# Patient Record
Sex: FEMALE | Race: WHITE | NOT HISPANIC OR LATINO | Employment: OTHER | URBAN - METROPOLITAN AREA
[De-identification: names, ages, dates, MRNs, and addresses within clinical notes are randomized per-mention and may not be internally consistent; named-entity substitution may affect disease eponyms.]

---

## 2019-02-06 ENCOUNTER — APPOINTMENT (EMERGENCY)
Dept: RADIOLOGY | Facility: HOSPITAL | Age: 49
End: 2019-02-06
Payer: COMMERCIAL

## 2019-02-06 ENCOUNTER — APPOINTMENT (EMERGENCY)
Dept: ULTRASOUND IMAGING | Facility: HOSPITAL | Age: 49
End: 2019-02-06
Payer: COMMERCIAL

## 2019-02-06 ENCOUNTER — HOSPITAL ENCOUNTER (EMERGENCY)
Facility: HOSPITAL | Age: 49
Discharge: HOME | End: 2019-02-06
Attending: EMERGENCY MEDICINE | Admitting: EMERGENCY MEDICINE
Payer: COMMERCIAL

## 2019-02-06 VITALS
TEMPERATURE: 98.8 F | DIASTOLIC BLOOD PRESSURE: 64 MMHG | RESPIRATION RATE: 18 BRPM | OXYGEN SATURATION: 97 % | BODY MASS INDEX: 38.41 KG/M2 | WEIGHT: 225 LBS | SYSTOLIC BLOOD PRESSURE: 125 MMHG | HEIGHT: 64 IN | HEART RATE: 67 BPM

## 2019-02-06 DIAGNOSIS — M25.561 RIGHT KNEE PAIN: Primary | ICD-10-CM

## 2019-02-06 PROCEDURE — 93971 EXTREMITY STUDY: CPT

## 2019-02-06 PROCEDURE — 99284 EMERGENCY DEPT VISIT MOD MDM: CPT

## 2019-02-06 PROCEDURE — 73562 X-RAY EXAM OF KNEE 3: CPT

## 2019-02-06 RX ORDER — NAPROXEN 250 MG/1
500 TABLET ORAL ONCE
Status: COMPLETED | OUTPATIENT
Start: 2019-02-06 | End: 2019-02-06

## 2019-02-06 RX ORDER — NAPROXEN 500 MG/1
500 TABLET ORAL 2 TIMES DAILY WITH MEALS
Qty: 20 TABLET | Refills: 0 | Status: SHIPPED | OUTPATIENT
Start: 2019-02-06 | End: 2019-02-16

## 2019-02-06 RX ORDER — HYDROCODONE BITARTRATE AND ACETAMINOPHEN 5; 325 MG/1; MG/1
1 TABLET ORAL EVERY 6 HOURS PRN
Qty: 10 TABLET | Refills: 0 | Status: SHIPPED | OUTPATIENT
Start: 2019-02-06 | End: 2019-02-09

## 2019-02-06 RX ADMIN — NAPROXEN 500 MG: 250 TABLET ORAL at 14:39

## 2019-02-06 NOTE — DISCHARGE INSTRUCTIONS
Please return if you worsening other concerning symptoms otherwise please follow up with Orthopedics for re-evaluation as instructed as discussed    Knee Pain   WHAT YOU NEED TO KNOW:   Knee pain may start suddenly, or it may be a long-term problem  You may have pain on the side, front, or back of your knee  You may have knee stiffness and swelling  You may hear popping sounds or feel like your knee is giving way or locking up as you walk  You may feel pain when you sit, stand, walk, or climb up and down stairs  Knee pain can be caused by conditions such as obesity, inflammation, or strains or tears in ligaments or tendons  DISCHARGE INSTRUCTIONS:   Follow up with your healthcare provider within 24 hours or as directed: You may need follow-up treatments, such as steroid injections to decrease pain  Write down your questions so you remember to ask them during your visits  Self-care:   · Rest  your knee so it can heal  Limit activities that increase your pain  · Ice  can help reduce swelling  Wrap ice in a towel and put it on your knee for as long and as often as directed  · Compression  with a brace or bandage can help reduce swelling  Use a brace or bandage only as directed  · Elevation  helps decrease pain and swelling  Elevate your knee while you are sitting or lying down  Prop your leg on pillows to keep your knee above the level of your heart  Medicines:   · NSAIDs  help decrease swelling and pain or fever  This medicine is available with or without a doctor's order  NSAIDs can cause stomach bleeding or kidney problems in certain people  If you take blood thinner medicine, always ask your healthcare provider if NSAIDs are safe for you  Always read the medicine label and follow directions  · Acetaminophen  decreases pain and fever  It is available without a doctor's order  Ask how much to take and when to take it  Follow directions  Acetaminophen can cause liver damage if not taken correctly  · Take your medicine as directed  Contact your healthcare provider if you think your medicine is not helping or if you have side effects  Tell him or her if you are allergic to any medicine  Keep a list of the medicines, vitamins, and herbs you take  Include the amounts, and when and why you take them  Bring the list or the pill bottles to follow-up visits  Carry your medicine list with you in case of an emergency  Exercise as directed: You may need to see a physical therapist or do recommended exercises to improve movement and decrease your pain  You may be directed to walk, swim, or ride a bike  Follow your exercise plan exactly as directed to avoid further injury  Contact your healthcare provider if:   · You have questions or concerns about your condition or care  Return to the emergency department if:   · Your pain is worse, even after treatment  · You cannot bend or straighten your leg completely  · The swelling around your knee does not go down even with treatment  · Your knee is painful and hot to the touch  © 2017 [address] Information is for End User's use only and may not be sold, redistributed or otherwise used for commercial purposes  All illustrations and images included in CareNotes® are the copyrighted property of [employer]  or [employer]  The above information is an  only  It is not intended as medical advice for individual conditions or treatments  Talk to your doctor, nurse or pharmacist before following any medical regimen to see if it is safe and effective for you

## 2019-02-06 NOTE — ED PROVIDER NOTES
History  Chief Complaint   Patient presents with    Leg Pain     Pt reports right leg pain for the past week  Does not recall initial injury  12-year-old female denies significant past medical history here presenting for evaluation of right leg pain  Patient reports over last week or so she has had progressive pain localized to her right proximal lateral leg and right lateral joint line of her knee does radiate posteriorly she states that is gotten progressively worse she describes sharp and stabbing it is worse with ambulating she has not taken anything for this she does note she felt her legs give out several times secondary to the discomfort and she presents today concern for possible DVT as her daughter is a radiology technician  Patient's pain is localized primarily to her right lateral joint line of her right knee again sharp and stabbing worse with walking better with rest it does radiate posteriorly into the back of her proximal calf and knee better with rest she has no weakness paresthesias anesthesia she has no other signs symptoms risk factors for DVT or PE she has no unilateral leg swelling no overlying skin changes no traumatic mechanism no redness warmth or swelling she has no other joint involvement no other injuries complaints or concerns no hip or back pain no history of similar she otherwise denies a complete review systems as noted            None       History reviewed  No pertinent past medical history  History reviewed  No pertinent surgical history  History reviewed  No pertinent family history  I have reviewed and agree with the history as documented  Social History   Substance Use Topics    Smoking status: Never Smoker    Smokeless tobacco: Never Used    Alcohol use No        Review of Systems   Constitutional: Negative for activity change, appetite change, chills and fever  Respiratory: Negative for cough and shortness of breath      Cardiovascular: Negative for chest pain and palpitations  Gastrointestinal: Negative for nausea and vomiting  Genitourinary: Negative for dysuria, frequency and urgency  Musculoskeletal: Negative for arthralgias, back pain, gait problem, joint swelling and myalgias  Right knee pain   Skin: Negative for color change, rash and wound  Neurological: Negative for dizziness, weakness, light-headedness, numbness and headaches  Hematological: Negative for adenopathy  Does not bruise/bleed easily  Psychiatric/Behavioral: Negative for agitation and behavioral problems  All other systems reviewed and are negative  Physical Exam  Physical Exam   Constitutional: She is oriented to person, place, and time  She appears well-developed and well-nourished  No distress  Very well-appearing no acute distress   HENT:   Head: Normocephalic and atraumatic  Eyes: Pupils are equal, round, and reactive to light  EOM are normal    Neck: Normal range of motion  Neck supple  No tracheal deviation present  Cardiovascular: Normal rate, regular rhythm and normal heart sounds  Exam reveals no gallop and no friction rub  No murmur heard  Pulmonary/Chest: Effort normal and breath sounds normal  She has no wheezes  She has no rales     Musculoskeletal:   Patient has normal gait is moderately tender along the lateral joint line of her right knee in her right anterior lateral knee, there is no overlying skin changes no warmth joint effusion swelling erythema or other acute findings negative valgus and varus stress test negative anterior posterior drawer quadriceps is intact is no tenderness over the tibial plateau there is no unilateral leg swelling calf pain tenderness varicosities she has full range of motion of the hip without discomfort there is no tenderness over the thigh no tenderness of the ankle or foot she has 2+ symmetric pulses without other acute ischemic infectious inflammatory or traumatic findings   Neurological: She is alert and oriented to person, place, and time  No cranial nerve deficit  She exhibits normal muscle tone  Coordination normal    Skin: Skin is warm and dry  No rash noted  Psychiatric: She has a normal mood and affect  Her behavior is normal    Nursing note and vitals reviewed  Vital Signs  ED Triage Vitals [02/06/19 1321]   Temperature Pulse Respirations Blood Pressure SpO2   98 8 °F (37 1 °C) 96 17 (!) 190/106 99 %      Temp Source Heart Rate Source Patient Position - Orthostatic VS BP Location FiO2 (%)   Oral Monitor Sitting Left arm --      Pain Score       9           Vitals:    02/06/19 1321 02/06/19 1445   BP: (!) 190/106 125/64   Pulse: 96 67   Patient Position - Orthostatic VS: Sitting Lying       Visual Acuity      ED Medications  Medications   naproxen (NAPROSYN) tablet 500 mg (500 mg Oral Given 2/6/19 1439)       Diagnostic Studies  Results Reviewed     None                 XR knee 3 views Right non injury   ED Interpretation by Brandt Luque DO (02/06 1435)   No acute abnormality      Final Result by Yanelis Campbell MD (02/06 1635)      No acute osseous abnormality              Workstation performed: [id number]         VAS lower limb venous duplex study, unilateral/limited    (Results Pending)              Procedures  Procedures       Phone Contacts  ED Phone Contact    ED Course  ED Course as of Feb 06 2308 Wed Feb 06, 2019   1643 Patient given ice packs Ace wrap symptom control close return follow-up instructions patient agreeable with                                MDM    Disposition  Final diagnoses:   Right knee pain     Time reflects when diagnosis was documented in both MDM as applicable and the Disposition within this note     Time User Action Codes Description Comment    2/6/2019  4:43 PM Faisal Singh Add [C85 782] Right knee pain       ED Disposition     ED Disposition Condition Date/Time Comment    Discharge  Wed Feb 6, 2019  4:43 PM Reanna White discharge to home/self care     Condition at discharge: Good        Follow-up Information     Follow up With Specialties Details Why Contact Info Additional Information    [address] [facility] Emergency Department Emergency Medicine  If symptoms worsen [address]  [phone] [place] [facility], [address] [place], [place], [address] [place] [facility] [place] [facility] In 1 week  [address] [facility]  [address] [phone]  [address] [facility] [place], [address] [address] [place], [place], [address]          Discharge Medication List as of 2/6/2019  4:45 PM      START taking these medications    Details   HYDROcodone-acetaminophen (NORCO) 5-325 mg per tablet Take 1 tablet by mouth every 6 (six) hours as needed for pain for up to 3 days Max Daily Amount: 4 tablets, Starting Wed 2/6/2019, Until Sat 2/9/2019, Print      naproxen (NAPROSYN) 500 mg tablet Take 1 tablet (500 mg total) by mouth 2 (two) times a day with meals for 10 days, Starting Wed 2/6/2019, Until Sat 2/16/2019, Print           No discharge procedures on file      ED Provider  Electronically Signed by           Debbie López DO  02/06/19 9624

## 2019-02-07 PROCEDURE — 93971 EXTREMITY STUDY: CPT | Performed by: SURGERY

## 2019-09-16 ENCOUNTER — HOSPITAL ENCOUNTER (EMERGENCY)
Facility: HOSPITAL | Age: 49
Discharge: HOME | End: 2019-09-16
Attending: EMERGENCY MEDICINE | Admitting: EMERGENCY MEDICINE

## 2019-09-16 ENCOUNTER — APPOINTMENT (EMERGENCY)
Dept: RADIOLOGY | Facility: HOSPITAL | Age: 49
End: 2019-09-16

## 2019-09-16 VITALS
SYSTOLIC BLOOD PRESSURE: 167 MMHG | DIASTOLIC BLOOD PRESSURE: 72 MMHG | OXYGEN SATURATION: 99 % | HEART RATE: 73 BPM | WEIGHT: 232.14 LBS | BODY MASS INDEX: 39.63 KG/M2 | HEIGHT: 64 IN | RESPIRATION RATE: 20 BRPM | TEMPERATURE: 98.5 F

## 2019-09-16 DIAGNOSIS — R05.9 COUGH: ICD-10-CM

## 2019-09-16 DIAGNOSIS — J20.9 ACUTE BRONCHITIS: Primary | ICD-10-CM

## 2019-09-16 PROCEDURE — 71046 X-RAY EXAM CHEST 2 VIEWS: CPT

## 2019-09-16 PROCEDURE — 94640 AIRWAY INHALATION TREATMENT: CPT

## 2019-09-16 PROCEDURE — 99283 EMERGENCY DEPT VISIT LOW MDM: CPT | Performed by: EMERGENCY MEDICINE

## 2019-09-16 PROCEDURE — 99283 EMERGENCY DEPT VISIT LOW MDM: CPT

## 2019-09-16 RX ORDER — ALBUTEROL SULFATE 90 UG/1
2 AEROSOL, METERED RESPIRATORY (INHALATION) EVERY 4 HOURS PRN
Qty: 1 INHALER | Refills: 0 | Status: SHIPPED | OUTPATIENT
Start: 2019-09-16

## 2019-09-16 RX ORDER — ALBUTEROL SULFATE 2.5 MG/3ML
2.5 SOLUTION RESPIRATORY (INHALATION) ONCE
Status: COMPLETED | OUTPATIENT
Start: 2019-09-16 | End: 2019-09-16

## 2019-09-16 RX ADMIN — ALBUTEROL SULFATE 2.5 MG: 2.5 SOLUTION RESPIRATORY (INHALATION) at 21:50

## 2019-09-17 NOTE — DISCHARGE INSTRUCTIONS
Use the albuterol as needed help with coughing spells and trouble breathing  Follow up with the primary care doctor to make sure that you are doing better  Return if you have worsening or changing symptoms, worsening trouble breathing that does not improve with albuterol, or for any other concerns

## 2019-09-17 NOTE — ED PROVIDER NOTES
History  Chief Complaint   Patient presents with    Cough     Per Pt " I think I have bronchitis or pneumonia  I am having hard time taking deep breaths and a cough "      HPI    Prior to Admission Medications   Prescriptions Last Dose Informant Patient Reported? Taking?   naproxen (NAPROSYN) 500 mg tablet   No No   Sig: Take 1 tablet (500 mg total) by mouth 2 (two) times a day with meals for 10 days      Facility-Administered Medications: None       Past Medical History:   Diagnosis Date    Bronchitis        Past Surgical History:   Procedure Laterality Date     SECTION         History reviewed  No pertinent family history  I have reviewed and agree with the history as documented  Social History     Tobacco Use    Smoking status: Never Smoker    Smokeless tobacco: Never Used   Substance Use Topics    Alcohol use: No    Drug use: No        Review of Systems    Physical Exam  Physical Exam   Constitutional: She is oriented to person, place, and time  She appears well-developed and well-nourished  No distress  HENT:   Head: Normocephalic and atraumatic  Right Ear: Tympanic membrane, external ear and ear canal normal    Left Ear: Tympanic membrane, external ear and ear canal normal    Nose: Rhinorrhea present  Mouth/Throat: Oropharynx is clear and moist and mucous membranes are normal  No oral lesions  No trismus in the jaw  Tonsils are 0 on the right  Tonsils are 0 on the left  No tonsillar exudate  Eyes: Pupils are equal, round, and reactive to light  Conjunctivae are normal    Neck: Normal range of motion  Neck supple  No tracheal deviation present  Cardiovascular: Normal rate, regular rhythm, normal heart sounds and intact distal pulses  Pulmonary/Chest: Effort normal  No respiratory distress  She has no decreased breath sounds  She has wheezes (mild, diffuse)  Speaking easily in full sentences  Coughing with deep breaths  Abdominal: Soft  She exhibits no distension   There is no tenderness  Musculoskeletal: She exhibits no edema  Lymphadenopathy:     She has no cervical adenopathy  Neurological: She is alert and oriented to person, place, and time  She has normal strength  GCS eye subscore is 4  GCS verbal subscore is 5  GCS motor subscore is 6  Skin: Skin is warm and dry  Psychiatric: She has a normal mood and affect  Her behavior is normal    Nursing note and vitals reviewed  Vital Signs  ED Triage Vitals [09/16/19 2108]   Temperature Pulse Respirations Blood Pressure SpO2   98 5 °F (36 9 °C) 73 20 167/72 99 %      Temp Source Heart Rate Source Patient Position - Orthostatic VS BP Location FiO2 (%)   Oral Monitor Sitting Left arm --      Pain Score       --           Vitals:    09/16/19 2108   BP: 167/72   Pulse: 73   Patient Position - Orthostatic VS: Sitting         Visual Acuity      ED Medications  Medications   albuterol inhalation solution 2 5 mg (2 5 mg Nebulization Given 9/16/19 2150)       Diagnostic Studies  Results Reviewed     None                 XR chest 2 views    (Results Pending)              Procedures  Procedures       ED Course                               MDM  Number of Diagnoses or Management Options  Acute bronchitis: new and requires workup  Cough: new and requires workup  Diagnosis management comments: This is a 66-year-old female who presents here today with concern for either bronchitis or pneumonia  She states about two weeks ago she began coughing  This has increased in frequency and severity  She states it is nonproductive  She states for the past couple of days she has had itching throughout her lungs and feels like she cannot take a deep breath  She denies any chest pain, palpitation, fevers, sputum production, known sick contacts  She does have problems with seasonal allergies but denies any acute worsening of this recently  She has been taking her allergy medication since March  She denies any known sick contacts    She does run a , but denies any infections currently going around  She denies a prior history of asthma, she is a nonsmoker and does not live with a smoker  She has been prescribed albuterol in the past because of breathing issues, but has not use this with her recent symptoms  She did not take any over-the-counter medications for her cough or trouble breathing  She has not yet seen her doctor  She denies any other medical problems  Review of systems:  Otherwise negative unless stated as above    She is well-appearing, in no acute distress  She has mild diffuse wheezing, and is coughing every time she tries to take a deep breath  Exam is otherwise unremarkable  This is most likely acute bronchitis given lack fevers  However, given her concern for possible pneumonia we will get a chest x-ray to evaluate for this  We will treat her with nebulizers here  Chest x-ray was reviewed by myself, and shows no acute abnormalities  She feels better after medications  She is still having a mild cough with taking deep breath but has decreased, and wheezing has resolved  The patient declines additional nebulizer here  I discussed with her findings, treatment at home, follow-up, and indications for return, and she expresses understanding with this plan         Amount and/or Complexity of Data Reviewed  Tests in the radiology section of CPT®: ordered and reviewed  Independent visualization of images, tracings, or specimens: yes        Disposition  Final diagnoses:   Acute bronchitis   Cough     Time reflects when diagnosis was documented in both MDM as applicable and the Disposition within this note     Time User Action Codes Description Comment    9/16/2019 10:23 PM Jessica Bruno Add [J20 9] Acute bronchitis     9/16/2019 10:23 PM Jessica Bruno Add [R05] Cough       ED Disposition     ED Disposition Condition Date/Time Comment    Discharge Good Mon Sep 16, 2019 10:23 PM Lorelei Rm discharge to home/self care  Follow-up Information     Follow up With Specialties Details Why Contact Info    Clau Zamora MD [facility] Schedule an appointment as soon as possible for a visit in 3 days as needed, to follow up on your symptoms [address] [place] [address] [address] [phone]            Patient's Medications   Discharge Prescriptions    ALBUTEROL (PROVENTIL HFA,VENTOLIN HFA) 90 MCG/ACT INHALER    Inhale 2 puffs every 4 (four) hours as needed for shortness of breath (coughing spells)       Start Date: 9/16/2019 End Date: --       Order Dose: 2 puffs       Quantity: 1 Inhaler    Refills: 0     No discharge procedures on file      ED Provider  Electronically Signed by           Santana Jain MD  09/16/19 5729